# Patient Record
Sex: MALE | Race: WHITE | NOT HISPANIC OR LATINO | URBAN - NONMETROPOLITAN AREA
[De-identification: names, ages, dates, MRNs, and addresses within clinical notes are randomized per-mention and may not be internally consistent; named-entity substitution may affect disease eponyms.]

---

## 2018-06-08 NOTE — PATIENT DISCUSSION
Eval with [facility] for ERM. Disc only eye and needs to watch carefully. Pt notices a change in vision.

## 2018-08-07 NOTE — PATIENT DISCUSSION
08/07/2018 (RETINA)- OCT/FA/FP at the next visit. Recommended following up in 6 months for a comprehensive visit.

## 2018-12-12 NOTE — PATIENT DISCUSSION
Replaced BCL today. It had broken into pieces. Long disc with pt that this is not a great long term option. IOP remains high and would suggest that she needs consult with [address] but cont with med for now.

## 2019-04-18 NOTE — PATIENT DISCUSSION
Despite some risk factors, the patient does not demonstrate definitive evidence of glaucoma at this time. Post-Care Instructions: Detailed post-operative instructions were reviewed with the patient and copy provided (see attached). Advised patient to diligently monitor for new, changing, persistent, and/or recurrent lesions, and if present, seek evaluation ASAP. Patient to follow up as advised and as per patient's general dermatology provider. Risks of failure to follow up as directed reviewed.

## 2020-02-27 NOTE — PATIENT DISCUSSION
thickening of erm os, pt is noticing that there has been a small decline in [place]. Disc another Eval with [facility] to make sure no treatment is nec.

## 2021-07-05 NOTE — PATIENT DISCUSSION
Problem: Pediatric High Fall Risk  Goal: Absence of falls  7/5/2021 1850 by Talha Shah RN  Outcome: Ongoing     Problem: Pediatric High Fall Risk  Goal: Pediatric High Risk Standard  7/5/2021 1850 by Talha Shah RN  Outcome: Ongoing     Problem: Skin Integrity:  Goal: Will show no infection signs and symptoms  Description: Will show no infection signs and symptoms  7/5/2021 1850 by Talha Shah RN  Outcome: Ongoing     Problem: Skin Integrity:  Goal: Absence of new skin breakdown  Description: Absence of new skin breakdown  7/5/2021 1850 by Talha Shah RN  Outcome: Ongoing     Problem: Fluid Volume:  Goal: Signs and symptoms of dehydration will decrease  Description: Signs and symptoms of dehydration will decrease  7/5/2021 1850 by Talha Shah RN  Outcome: Ongoing     Problem: Fluid Volume:  Goal: Ability to achieve a balanced intake and output will improve  Description: Ability to achieve a balanced intake and output will improve  7/5/2021 1850 by Talha Shah RN  Outcome: Ongoing     Problem: Fluid Volume:  Goal: Diagnostic test results will improve  Description: Diagnostic test results will improve  7/5/2021 1850 by Talha Shah RN  Outcome: Ongoing     Problem: Physical Regulation:  Goal: Ability to maintain vital signs within normal range will improve  Description: Ability to maintain vital signs within normal range will improve  7/5/2021 1850 by Talha Shah RN  Outcome: Ongoing     Problem: Pediatric Low Fall Risk  Goal: Absence of falls  7/5/2021 1850 by Talha Shah RN  Outcome: Ongoing     Problem: Pediatric Low Fall Risk  Goal: Pediatric Low Risk Standard  7/5/2021 1850 by Talha Shah RN  Outcome: Ongoing     Problem: Pain:  Goal: Control of acute pain  Description: Control of acute pain  7/5/2021 1850 by Talha Shah RN  Outcome: Ongoing     Problem: Pain:  Goal: Pain level will decrease  Description: Pain level will decrease  7/5/2021 1850 by Talha Shah Recommended against surgery at this time given that patient is happy with present vision.

## 2021-08-12 ENCOUNTER — IMPORTED ENCOUNTER (OUTPATIENT)
Dept: URBAN - NONMETROPOLITAN AREA CLINIC 1 | Facility: CLINIC | Age: 71
End: 2021-08-12

## 2021-08-12 PROBLEM — H52.4: Noted: 2021-08-12

## 2021-08-12 PROBLEM — H52.13: Noted: 2021-08-12

## 2021-08-12 PROBLEM — H25.13: Noted: 2021-08-12

## 2021-08-12 PROBLEM — E11.9: Noted: 2021-08-12

## 2021-08-12 PROCEDURE — 92004 COMPRE OPH EXAM NEW PT 1/>: CPT

## 2021-08-12 PROCEDURE — 92015 DETERMINE REFRACTIVE STATE: CPT

## 2021-08-12 NOTE — PATIENT DISCUSSION
Myopia/Presbyopia OUDiscussed refractive status in detail with patient. New glasses Rx given today. Continue to monitor. NIDDM sans Retinopathy Discussed diagnosis with patient. Discussed the risk of diabetic damage of the retina with potential vision loss and the importance of routine follow-up. Emphasized strict blood sugar control. Continue to monitor. Adela OUDiscussed diagnosis with patient. Reviewed symptoms related to cataract progression. No treatment required at this time. Continue to monitor.

## 2022-04-10 ASSESSMENT — VISUAL ACUITY
OS_SC: 20/20
OD_SC: 20/20

## 2022-04-10 ASSESSMENT — TONOMETRY
OD_IOP_MMHG: 15
OS_IOP_MMHG: 15

## 2022-06-01 NOTE — PATIENT DISCUSSION
poss Eval with [address] with NI with IOP. Goal of less than 40 OS for comfort. Modified Advancement Flap Text: The defect edges were debeveled with a #15 scalpel blade.  Given the location of the defect, shape of the defect and the proximity to free margins a modified advancement flap was deemed most appropriate.  Using a sterile surgical marker, an appropriate advancement flap was drawn incorporating the defect and placing the expected incisions within the relaxed skin tension lines where possible.    The area thus outlined was incised deep to adipose tissue with a #15 scalpel blade.  The skin margins were undermined to an appropriate distance in all directions utilizing iris scissors.

## 2022-10-27 NOTE — PROCEDURE NOTE: CLINICAL
PROCEDURE NOTE: Epilation #1 Left Upper Lid. Diagnosis: Trichiasis without Entropion. Anesthesia: Topical. Prior to treatment, the risks/benefits/alternatives were discussed. The patient wished to proceed with procedure. Aberrant lashes removed from * lid(s) using microforcep. Patient tolerated procedure well. There were no complications. Post-op instructions given. Catrina Bucio

## 2022-10-27 NOTE — PATIENT DISCUSSION
Discussed the importance of blood pressure control in the prevention of ocular complications. Attending Attestation (For Attendings USE Only)...

## 2023-08-07 ENCOUNTER — COMPREHENSIVE EXAM (OUTPATIENT)
Dept: URBAN - NONMETROPOLITAN AREA CLINIC 1 | Facility: CLINIC | Age: 73
End: 2023-08-07

## 2023-08-07 DIAGNOSIS — H52.4: ICD-10-CM

## 2023-08-07 DIAGNOSIS — H52.13: ICD-10-CM

## 2023-08-07 PROCEDURE — 92015 DETERMINE REFRACTIVE STATE: CPT

## 2023-08-07 PROCEDURE — 92014 COMPRE OPH EXAM EST PT 1/>: CPT

## 2023-08-07 ASSESSMENT — TONOMETRY
OS_IOP_MMHG: 16
OD_IOP_MMHG: 15

## 2023-08-07 ASSESSMENT — VISUAL ACUITY
OD_CC: 20/20-2
OS_CC: 20/20

## 2024-08-08 ENCOUNTER — COMPREHENSIVE EXAM (OUTPATIENT)
Dept: URBAN - NONMETROPOLITAN AREA CLINIC 1 | Facility: CLINIC | Age: 74
End: 2024-08-08

## 2024-08-08 DIAGNOSIS — H52.13: ICD-10-CM

## 2024-08-08 DIAGNOSIS — H52.4: ICD-10-CM

## 2024-08-08 PROCEDURE — 92014 COMPRE OPH EXAM EST PT 1/>: CPT

## 2024-08-08 PROCEDURE — 92015 DETERMINE REFRACTIVE STATE: CPT

## 2024-08-08 ASSESSMENT — TONOMETRY
OS_IOP_MMHG: 18
OD_IOP_MMHG: 18

## 2024-08-08 ASSESSMENT — VISUAL ACUITY
OS_CC: 20/20
OD_CC: 20/20-1
OU_CC: 20/20

## 2024-08-15 NOTE — PATIENT DISCUSSION
Submitted PA for Desvenlafaxine Succinate ER 25MG er tablets   Via CMM Key: [id number] STATUS: Your PA request for [id number] was approved for 365 days. The PA# assigned is [id number].  Authorization Expiration Date: 8/13/2025    Please notify patient. Thank you.      We recommended the patient begin Alphagan.  Patient is currently on Timolol and Latanoprost.